# Patient Record
Sex: FEMALE | Race: WHITE
[De-identification: names, ages, dates, MRNs, and addresses within clinical notes are randomized per-mention and may not be internally consistent; named-entity substitution may affect disease eponyms.]

---

## 2021-04-22 ENCOUNTER — HOSPITAL ENCOUNTER (OUTPATIENT)
Dept: HOSPITAL 76 - DI.N | Age: 46
Discharge: HOME | End: 2021-04-22
Attending: GENERAL ACUTE CARE HOSPITAL
Payer: COMMERCIAL

## 2021-04-22 DIAGNOSIS — Z12.31: Primary | ICD-10-CM

## 2021-04-23 NOTE — MAMMOGRAPHY REPORT
BILATERAL DIGITAL SCREENING MAMMOGRAM 3D/2D: 4/22/2021

CLINICAL: Routine screening.  

 

Comparison is made to exams dated:  4/1/2019 mammogram, 2/23/2017 mammogram, 1/10/2017 mammogram, and
 12/15/2015 mammogram - [facility].  The tissue of both breasts is heterogeneously dense. Th
is may lower the sensitivity of mammography.  

 

There are a stable benign focal asymmetry and calcification in the right breast.  There also is a sta
ble benign calcification in the left breast.  

 

No significant masses, calcifications, or other findings are seen in either breast.  

 

There has been no significant interval change.

 

IMPRESSION: BENIGN

There is no mammographic evidence of malignancy. A 1 year screening mammogram is recommended.  

 

 

This exam was interpreted at Station ID: [id number].  

 

NOTE: For mammograms, a report in lay terms will be sent to the patient. Approximately 15% of breast 
malignancies will not be visualized mammographically. In the management of a palpable breast mass, a 
negative mammogram must not discourage biopsy of a clinically suspicious lesion.

 

Electronically Signed By: Chaz Day          

acr/:4/22/2021 12:25:29  

 

 

 

ACR BI-RADS Category 2: Benign Finding(s) 3342F

PARENCHYMAL PATTERN: (D) - The breast(s) demonstrate(s) heterogeneously dense fibroglandular andres alfaro.

BI-RADS CATEGORY: (2) - 2

RECOMMENDATION: (ANNUAL)  - Recommend routine annual screening mammography.

[id number]

1 year screening

LATERALITY: (B)